# Patient Record
Sex: FEMALE | Race: WHITE | NOT HISPANIC OR LATINO
[De-identification: names, ages, dates, MRNs, and addresses within clinical notes are randomized per-mention and may not be internally consistent; named-entity substitution may affect disease eponyms.]

---

## 2017-05-02 ENCOUNTER — RESULT REVIEW (OUTPATIENT)
Age: 57
End: 2017-05-02

## 2017-05-02 ENCOUNTER — OUTPATIENT (OUTPATIENT)
Dept: OUTPATIENT SERVICES | Facility: HOSPITAL | Age: 57
LOS: 1 days | Discharge: HOME | End: 2017-05-02

## 2017-05-04 LAB — SURGICAL PATHOLOGY STUDY: SIGNIFICANT CHANGE UP

## 2017-05-08 DIAGNOSIS — K43.9 VENTRAL HERNIA WITHOUT OBSTRUCTION OR GANGRENE: ICD-10-CM

## 2017-05-08 DIAGNOSIS — L90.5 SCAR CONDITIONS AND FIBROSIS OF SKIN: ICD-10-CM

## 2017-05-08 DIAGNOSIS — Z88.5 ALLERGY STATUS TO NARCOTIC AGENT: ICD-10-CM

## 2017-05-08 DIAGNOSIS — E65 LOCALIZED ADIPOSITY: ICD-10-CM

## 2020-06-08 ENCOUNTER — APPOINTMENT (OUTPATIENT)
Dept: NEUROSURGERY | Facility: CLINIC | Age: 60
End: 2020-06-08
Payer: COMMERCIAL

## 2020-06-08 DIAGNOSIS — Z87.09 PERSONAL HISTORY OF OTHER DISEASES OF THE RESPIRATORY SYSTEM: ICD-10-CM

## 2020-06-08 DIAGNOSIS — M79.10 MYALGIA, UNSPECIFIED SITE: ICD-10-CM

## 2020-06-08 DIAGNOSIS — M47.812 SPONDYLOSIS W/OUT MYELOPATHY OR RADICULOPATHY, CERVICAL REGION: ICD-10-CM

## 2020-06-08 PROCEDURE — 99204 OFFICE O/P NEW MOD 45 MIN: CPT | Mod: 95

## 2020-06-08 RX ORDER — TRAMADOL HYDROCHLORIDE 25 MG/1
TABLET, COATED ORAL
Refills: 0 | Status: ACTIVE | COMMUNITY

## 2020-06-08 RX ORDER — BUTALB/ACETAMINOPHEN/CAFFEINE 50-325-40
TABLET ORAL
Refills: 0 | Status: ACTIVE | COMMUNITY

## 2020-06-08 RX ORDER — LIDOCAINE 5% 700 MG/1
5 PATCH TOPICAL
Refills: 0 | Status: ACTIVE | COMMUNITY

## 2020-06-08 RX ORDER — IBUPROFEN 800 MG/1
TABLET, FILM COATED ORAL
Refills: 0 | Status: ACTIVE | COMMUNITY

## 2020-06-08 RX ORDER — MELOXICAM 15 MG/1
TABLET ORAL
Refills: 0 | Status: ACTIVE | COMMUNITY

## 2020-06-08 NOTE — REASON FOR VISIT
[Home] : at home, [unfilled] , at the time of the visit. [Medical Office: ([facility])___] : at the medical office located in  [Verbal consent obtained from patient] : the patient, [unfilled]

## 2020-06-09 NOTE — HISTORY OF PRESENT ILLNESS
[de-identified] : Ms. Gan presents for evaluation of persistent neck pain and spasms. She notes tightness in the trapezii worse on the left along with suboccipital spasms/headaches. She trialed cervical facet blocks and RFA, however she noted an increase in pain after the RFA. She then trialed ESIs and TPI's without lasting relief. She is using Lidocaine patches, Mobic, Ibuprofen, and Tramadol (both short and long acting tabs) all PRN. She has trialed muscle relaxants and oral steroids without relief. Her pain is constant and extremely bothersome. Recently she had Botox injections which has provided some relief. Since Botox she has been able to decrease her narcotic intake. She also takes Fioricet for the suboccipital headaches. Physical therapy (TENS, massage, heat, stretching) have been trialed. She denies weakness, radiculopathy, bowel / bladder dysfunction. \par \par I have reviewed an MRI of the cervical spine 8/5/19. She is found to have diffuse cervical spondylosis most impressive at C4/5 and C5/6 with associated stenosis. No cord compression or cord signal changes.

## 2020-06-09 NOTE — ASSESSMENT
[FreeTextEntry1] : We had a thorough discussion regarding her condition and findings. Her chief complaint is myofascial neck pain with associated spasms. I have recommended she continue with conservative treatments. Botox has been beneficial in conjunction with massage therapy. I have also recommended acupuncture. She is considering repeat cervical facet blocks / RFA. At this time I am not recommending surgical intervention for her isolated neck pain / spasms; however, in the future if her symptoms are refractory to conservative managements then I would recommended a C4/5 and C5/6 ACDF. She will continue to follow up with pain management as scheduled. \par \par This consultation took 45 minutes

## 2020-07-14 ENCOUNTER — APPOINTMENT (OUTPATIENT)
Dept: ULTRASOUND IMAGING | Facility: HOSPITAL | Age: 60
End: 2020-07-14
Payer: COMMERCIAL

## 2020-07-14 ENCOUNTER — OUTPATIENT (OUTPATIENT)
Dept: OUTPATIENT SERVICES | Facility: HOSPITAL | Age: 60
LOS: 1 days | End: 2020-07-14
Payer: COMMERCIAL

## 2020-07-14 ENCOUNTER — RESULT REVIEW (OUTPATIENT)
Age: 60
End: 2020-07-14

## 2020-07-14 PROCEDURE — 77065 DX MAMMO INCL CAD UNI: CPT

## 2020-07-14 PROCEDURE — 19083 BX BREAST 1ST LESION US IMAG: CPT

## 2020-07-14 PROCEDURE — 88305 TISSUE EXAM BY PATHOLOGIST: CPT

## 2020-07-14 PROCEDURE — 88305 TISSUE EXAM BY PATHOLOGIST: CPT | Mod: 26

## 2020-07-14 PROCEDURE — 19083 BX BREAST 1ST LESION US IMAG: CPT | Mod: LT

## 2020-07-14 PROCEDURE — A4648: CPT

## 2020-07-14 PROCEDURE — 77065 DX MAMMO INCL CAD UNI: CPT | Mod: 26,LT

## 2020-07-16 LAB — SURGICAL PATHOLOGY STUDY: SIGNIFICANT CHANGE UP

## 2022-09-20 ENCOUNTER — APPOINTMENT (OUTPATIENT)
Dept: MRI IMAGING | Facility: HOSPITAL | Age: 62
End: 2022-09-20